# Patient Record
Sex: MALE | Race: WHITE | NOT HISPANIC OR LATINO | Employment: OTHER | URBAN - METROPOLITAN AREA
[De-identification: names, ages, dates, MRNs, and addresses within clinical notes are randomized per-mention and may not be internally consistent; named-entity substitution may affect disease eponyms.]

---

## 2020-03-25 ENCOUNTER — SLEEP CENTER VISIT (OUTPATIENT)
Dept: SLEEP MEDICINE | Facility: MEDICAL CENTER | Age: 70
End: 2020-03-25
Payer: MEDICARE

## 2020-03-25 VITALS
OXYGEN SATURATION: 97 % | BODY MASS INDEX: 26.48 KG/M2 | HEIGHT: 70 IN | HEART RATE: 74 BPM | WEIGHT: 185 LBS | RESPIRATION RATE: 16 BRPM | DIASTOLIC BLOOD PRESSURE: 78 MMHG | SYSTOLIC BLOOD PRESSURE: 116 MMHG

## 2020-03-25 DIAGNOSIS — G47.33 OSA (OBSTRUCTIVE SLEEP APNEA): ICD-10-CM

## 2020-03-25 DIAGNOSIS — J45.30 MILD PERSISTENT ASTHMA WITHOUT COMPLICATION: ICD-10-CM

## 2020-03-25 PROCEDURE — 99204 OFFICE O/P NEW MOD 45 MIN: CPT | Performed by: INTERNAL MEDICINE

## 2020-03-25 RX ORDER — PAROXETINE 10 MG/1
TABLET, FILM COATED ORAL DAILY
Status: ON HOLD | COMMUNITY
Start: 2020-03-11 | End: 2021-06-09

## 2020-03-25 RX ORDER — TAMSULOSIN HYDROCHLORIDE 0.4 MG/1
CAPSULE ORAL
COMMUNITY
Start: 2020-03-09

## 2020-03-25 RX ORDER — MONTELUKAST SODIUM 10 MG/1
TABLET ORAL DAILY
COMMUNITY
Start: 2020-03-21

## 2020-03-25 RX ORDER — LORATADINE PSEUDOEPHEDRINE SULFATE 10; 240 MG/1; MG/1
TABLET, EXTENDED RELEASE ORAL
COMMUNITY
Start: 2020-02-21

## 2020-03-25 SDOH — HEALTH STABILITY: MENTAL HEALTH: HOW OFTEN DO YOU HAVE A DRINK CONTAINING ALCOHOL?: MONTHLY OR LESS

## 2020-03-25 SDOH — HEALTH STABILITY: MENTAL HEALTH: HOW OFTEN DO YOU HAVE 6 OR MORE DRINKS ON ONE OCCASION?: NEVER

## 2020-03-25 SDOH — HEALTH STABILITY: MENTAL HEALTH: HOW MANY STANDARD DRINKS CONTAINING ALCOHOL DO YOU HAVE ON A TYPICAL DAY?: 1 OR 2

## 2020-03-25 NOTE — PROGRESS NOTES
Chief Complaint   Patient presents with   • New Patient     sleep consultation, currently on CPAP       HPI: This patient is a 69 y.o. male who is referred for sleep apnea and asthma management.  He is a former patient of Dr. Dykes in [place].  Medical records are requested.  He was diagnosed with sleep apnea in 2014 and has successfully been using CPAP since then.  He admits he cannot sleep without his CPAP machine.  Compliance card confirms 100% CPAP usage at [facility]: 5-15 cm H20, for over 8 hours nightly.  His average AHI is 2.8.  He sleeps well on CPAP and awakens feeling rested.  His [place] sleepiness score is 5.  He uses nasal pillows.  He denies any issues with CPAP therapy however requires establishment with DME for supplies.  He has history of lifelong asthma since childhood.  Asthma has generally been well controlled on low-dose Advair 100 mcg daily and Singulair.  Over the past year he returned from a cruise with URI which exacerbated his asthma symptoms.  Per his report recent spirometry with his PCP showed his pulmonary function between 60 -70% predicted.  He was instructed to use Advair 100 mcg twice daily.  He exercises by swimming, saurabh chi and yoga. Denies JULIANNA use.   Asthma has been triggered by allergies which he feels have been overall well controlled on Singulair and Allegra.  Denies dyspnea, wheezing or chest tightness.  He has an estimated 15-pack-year history of tobacco use, has not smoked since 1980.      Chief Complaint   Patient presents with   • New Patient     sleep consultation, currently on CPAP       Past Medical History:   Diagnosis Date   • Allergic rhinitis    • Asthma    • Back pain    • Bronchitis    • Hypertension    • Nasal drainage    • Sleep apnea        Social History     Socioeconomic History   • Marital status:      Spouse name: Not on file   • Number of children: Not on file   • Years of education: Not on file   • Highest education level: Not on file    Occupational History   • Not on file   Social Needs   • Financial resource strain: Not on file   • Food insecurity     Worry: Not on file     Inability: Not on file   • Transportation needs     Medical: Not on file     Non-medical: Not on file   Tobacco Use   • Smoking status: Former Smoker     Packs/day: 1.50     Years: 10.00     Pack years: 15.00     Last attempt to quit:      Years since quittin.2   • Smokeless tobacco: Never Used   Substance and Sexual Activity   • Alcohol use: Yes     Frequency: Monthly or less     Drinks per session: 1 or 2     Binge frequency: Never   • Drug use: Never   • Sexual activity: Not on file   Lifestyle   • Physical activity     Days per week: Not on file     Minutes per session: Not on file   • Stress: Not on file   Relationships   • Social connections     Talks on phone: Not on file     Gets together: Not on file     Attends [employer] service: Not on file     Active member of club or organization: Not on file     Attends meetings of clubs or organizations: Not on file     Relationship status: Not on file   • Intimate partner violence     Fear of current or ex partner: Not on file     Emotionally abused: Not on file     Physically abused: Not on file     Forced sexual activity: Not on file   Other Topics Concern   • Not on file   Social History Narrative   • Not on file       Family History   Problem Relation Age of Onset   • Respiratory Disease Mother        Current Outpatient Medications on File Prior to Visit   Medication Sig Dispense Refill   • montelukast (SINGULAIR) 10 MG Tab Take  by mouth every day. Take 1 tablet by mouth every day     • PARoxetine (PAXIL) 10 MG Tab Take  by mouth every day. Take 1 tablet by mouth every day     • tamsulosin (FLOMAX) 0.4 MG capsule TK 1 C PO ONE TIME D     • CLARITIN-D 24 HOUR  MG per tablet TK 1 T PO  ONE TIME D PRN     • fluticasone-salmeterol (ADVAIR DISKUS) 100-50 MCG/DOSE AEROSOL POWDER, BREATH ACTIVATED Inhale 1 Puff  "by mouth every 12 hours.     • Fexofenadine-Pseudoephedrine (ALLEGRA-D 12 HOUR PO) Take  by mouth.     • Multiple Vitamins-Minerals (PRESERVISION AREDS PO) Take  by mouth.     • Acetaminophen (TYLENOL PO) Take  by mouth.       No current facility-administered medications on file prior to visit.        Allergies: Zithromax [azithromycin]    ROS:   Constitutional: Denies fevers, chills, night sweats, fatigue or weight loss  Eyes: Denies vision loss, pain, drainage, double vision  Ears, Nose, Throat: Denies earache, difficulty hearing, +tinnitus, denies nasal congestion, hoarseness  Cardiovascular: Denies chest pain, tightness, palpitations, orthopnea or edema  Respiratory: Denies shortness of breath, cough, wheezing, hemoptysis  Sleep: Denies daytime sleepiness, snoring, apneas, insomnia, morning headaches  GI: Denies heartburn, dysphagia, nausea, abdominal pain, diarrhea or constipation  : Denies frequent urination, hematuria, discharge or painful urination  Musculoskeletal: Denies back pain, painful joints, sore muscles  Neurological: Denies weakness or headaches  Skin: No rashes    /78 (BP Location: Left arm, Patient Position: Sitting, BP Cuff Size: Adult)   Pulse 74   Resp 16   Ht 1.778 m (5' 10\")   Wt 83.9 kg (185 lb)   SpO2 97%     Physical Exam:  Appearance: Well-nourished, well-developed, in no acute distress  HEENT: Normocephalic, atraumatic, white sclera, PERRLA, oropharynx clear, Mallampati 3  Neck: No adenopathy or masses  Respiratory: no intercostal retractions or accessory muscle use  Lungs auscultation: Clear to auscultation bilaterally  Cardiovascular: Regular rate rhythm. No murmurs, rubs or gallops.  No LE edema  Abdomen: soft, nondistended  Gait: Normal  Digits: No clubbing, cyanosis  Motor: No focal deficits  Orientation: Oriented to time, person and place    Diagnosis:  1. RAUDEL (obstructive sleep apnea)     2. BMI 26.0-26.9,adult     3. Mild persistent asthma without complication  " PULMONARY FUNCTION TESTS -Test requested: Complete Pulmonary Function Test    REFERRAL TO PULMONOLOGY   4.      Allergic rhinitis      Plan:  The patient has RAUDEL, on CPAP therapy since  with excellent compliance and response to treatment.  He presents to establish care for RAUDEL and asthma.  Continue AutoPap: 5-15 cm H20.  We will establish him with the DME for CPAP supplies.  His sleep study results have been requested from [facility] in [place].  Asthma symptoms have been clinically well controlled although spirometry has allegedly showed decline.  Concur with step up to Advair 100 mcg twice daily dosing.    Continue Singulair 10 mg nightly.    Start nasal saline irrigation for allergic rhinitis using NeilMed Sinus Rinse.  We will obtain full pulmonary function testing in 6 months for follow-up.  Return in about 6 months (around 2020) for with PFT, at Pulmonary clinic.        Past Medical History:   Diagnosis Date   • Allergic rhinitis    • Asthma    • Back pain    • Bronchitis    • Hypertension    • Nasal drainage    • Sleep apnea        Social History     Socioeconomic History   • Marital status:      Spouse name: Not on file   • Number of children: Not on file   • Years of education: Not on file   • Highest education level: Not on file   Occupational History   • Not on file   Social Needs   • Financial resource strain: Not on file   • Food insecurity     Worry: Not on file     Inability: Not on file   • Transportation needs     Medical: Not on file     Non-medical: Not on file   Tobacco Use   • Smoking status: Former Smoker     Packs/day: 1.50     Years: 10.00     Pack years: 15.00     Last attempt to quit: 1980     Years since quittin.2   • Smokeless tobacco: Never Used   Substance and Sexual Activity   • Alcohol use: Yes     Frequency: Monthly or less     Drinks per session: 1 or 2     Binge frequency: Never   • Drug use: Never   • Sexual activity: Not on file   Lifestyle   •  Physical activity     Days per week: Not on file     Minutes per session: Not on file   • Stress: Not on file   Relationships   • Social connections     Talks on phone: Not on file     Gets together: Not on file     Attends Mu-ism service: Not on file     Active member of club or organization: Not on file     Attends meetings of clubs or organizations: Not on file     Relationship status: Not on file   • Intimate partner violence     Fear of current or ex partner: Not on file     Emotionally abused: Not on file     Physically abused: Not on file     Forced sexual activity: Not on file   Other Topics Concern   • Not on file   Social History Narrative   • Not on file       Family History   Problem Relation Age of Onset   • Respiratory Disease Mother        Current Outpatient Medications on File Prior to Visit   Medication Sig Dispense Refill   • montelukast (SINGULAIR) 10 MG Tab Take  by mouth every day. Take 1 tablet by mouth every day     • PARoxetine (PAXIL) 10 MG Tab Take  by mouth every day. Take 1 tablet by mouth every day     • tamsulosin (FLOMAX) 0.4 MG capsule TK 1 C PO ONE TIME D     • CLARITIN-D 24 HOUR  MG per tablet TK 1 T PO  ONE TIME D PRN     • fluticasone-salmeterol (ADVAIR DISKUS) 100-50 MCG/DOSE AEROSOL POWDER, BREATH ACTIVATED Inhale 1 Puff by mouth every 12 hours.     • Fexofenadine-Pseudoephedrine (ALLEGRA-D 12 HOUR PO) Take  by mouth.     • Multiple Vitamins-Minerals (PRESERVISION AREDS PO) Take  by mouth.     • Acetaminophen (TYLENOL PO) Take  by mouth.       No current facility-administered medications on file prior to visit.        -See above-

## 2021-06-09 PROBLEM — M19.011 PRIMARY OSTEOARTHRITIS, RIGHT SHOULDER: Status: ACTIVE | Noted: 2021-06-09

## 2022-02-02 ENCOUNTER — OFFICE VISIT (OUTPATIENT)
Dept: SLEEP MEDICINE | Facility: MEDICAL CENTER | Age: 72
End: 2022-02-02
Payer: MEDICARE

## 2022-02-02 VITALS
DIASTOLIC BLOOD PRESSURE: 80 MMHG | SYSTOLIC BLOOD PRESSURE: 138 MMHG | HEART RATE: 68 BPM | BODY MASS INDEX: 27.62 KG/M2 | HEIGHT: 71 IN | OXYGEN SATURATION: 97 % | RESPIRATION RATE: 16 BRPM | WEIGHT: 197.3 LBS

## 2022-02-02 DIAGNOSIS — J45.20 MILD INTERMITTENT ASTHMA WITHOUT COMPLICATION: ICD-10-CM

## 2022-02-02 DIAGNOSIS — R05.9 COUGH: ICD-10-CM

## 2022-02-02 DIAGNOSIS — Z87.891 FORMER SMOKER: ICD-10-CM

## 2022-02-02 DIAGNOSIS — G47.33 OSA (OBSTRUCTIVE SLEEP APNEA): ICD-10-CM

## 2022-02-02 PROBLEM — R06.83 SNORING: Status: ACTIVE | Noted: 2022-02-02

## 2022-02-02 PROCEDURE — 99214 OFFICE O/P EST MOD 30 MIN: CPT | Performed by: NURSE PRACTITIONER

## 2022-02-02 ASSESSMENT — PATIENT HEALTH QUESTIONNAIRE - PHQ9: CLINICAL INTERPRETATION OF PHQ2 SCORE: 0

## 2022-02-02 NOTE — PROGRESS NOTES
Chief Complaint   Patient presents with   • Follow-Up     RAUDEL       HPI:  Aiden Black is a 71 y.o. year old male here today for follow-up on RAUDEL.  Last seen 3/25/2020 by Dr Levine.  He was diagnosed with sleep apnea in 2014 and has successfully been using CPAP since then.  He admits he cannot sleep without his CPAP machine.    He also has a past medical history of asthma which is usually managed by his PCP.  He currently uses Breo daily but does not use a rescue inhaler.  Patient does note that he recently experienced a period of time where he had a persistent cough due to allergies and postnasal drip.  He was using his rescue inhaler more frequently at that time.  He does have a significant history of allergies and was recently getting desensitization shots when he was younger.  He has a distant former smoker with an relative 10-pack-year history and quit in 1980.  Also of significance, he notes frequent woodworking and a prior occupation as a .  He also does some work with [employer].  He also has some chemical exposure in the past.  He currently denies any new or worsening dyspnea or shortness of breath, chest tightness, congestion, hemoptysis, fevers, chills, night sweats, or any other difficulties with breathing.  He was relatively active in the past by swimming for exercise, but since Covid he has not participated in many activities.  He is up-to-date on all immunizations.    Compliance was reviewed with patient and does show 100% use with an average time of 8 hours and 28 minutes and a resultant AHI of 3.9.  Patient currently on a Sarah Respironics device set at auto CPAP of 5 to 15 cm/H2O.  Mean pressure is 6.5 with a peak average pressure of 8.8.  No evidence of leakage on compliance.    Sleep history:  PSG (5/14/2014) performed at [facility].  Indicative of moderate RAUDEL with AHI of 20 O2 laila of 74% and a sleep efficacy of 96%.    ROS: As per HPI and otherwise negative if not  "stated.    Past Medical History:   Diagnosis Date   • Allergic rhinitis    • Allergies    • Asthma    • Back pain    • Bronchitis    • Nasal drainage    • Sleep apnea        Past Surgical History:   Procedure Laterality Date   • PB SHLDR ARTHROSCOP,SURG,W/ROTAT CUFF REPB Right 6/9/2021    Procedure: RIGHT SHOULDER ARTHROSCOPY, ROTATOR CUFF REPAIR, SUBACROMIAL DECOMPRESSION, DISTAL CLAVICLE RESECTION, EXTENSIVE DEBRIDEMENT, RIGHT CARPAL TUNNEL RELEASE;  Surgeon: Maciel Goodman M.D.;  Location: [facility];  Service: Orthopedics   • ARTHROSCOPY, KNEE     • LAMINOTOMY     • SHOULDER SURGERY Left        Family History   Problem Relation Age of Onset   • Respiratory Disease Mother        Allergies as of 02/02/2022 - Reviewed 02/02/2022   Allergen Reaction Noted   • Zithromax [azithromycin] Rash 03/25/2020        Vitals:  /80 (BP Location: Left arm, Patient Position: Sitting, BP Cuff Size: Adult)   Pulse 68   Resp 16   Ht 1.803 m (5' 11\")   Wt 89.5 kg (197 lb 4.8 oz)   SpO2 97%     Current medications as of today   Current Outpatient Medications   Medication Sig Dispense Refill   • albuterol (PROAIR HFA) 108 (90 Base) MCG/ACT Aero Soln inhalation aerosol Inhale 1 Puff 3 times a day as needed.     • glucosamine Sulfate 500 MG Cap Take 500 mg by mouth every day.     • ibuprofen (MOTRIN) 200 MG Tab Take 200 mg by mouth every 8 hours as needed.     • guaiFENesin (MUCINEX PO) Take 1,200 mg by mouth 2 times a day.     • montelukast (SINGULAIR) 10 MG Tab Take  by mouth every day. Take 1 tablet by mouth every day     • tamsulosin (FLOMAX) 0.4 MG capsule TK 1 C PO ONE TIME D     • CLARITIN-D 24 HOUR  MG per tablet TK 1 T PO  ONE TIME D PRN     • Multiple Vitamins-Minerals (PRESERVISION AREDS PO) Take  by mouth.     • Acetaminophen (TYLENOL PO) Take  by mouth.     • Naproxen Sodium 220 MG Cap Take 2 Capsules by mouth 2 times a day as needed. (Patient not taking: Reported on 2/2/2022)     • sertraline " (ZOLOFT) 100 MG Tab Take 100 mg by mouth every day. (Patient not taking: Reported on 2/2/2022)     • cyclobenzaprine (FLEXERIL) 10 mg Tab Take 5 mg by mouth 3 times a day as needed. (Patient not taking: Reported on 2/2/2022)     • fluticasone-salmeterol (ADVAIR DISKUS) 100-50 MCG/DOSE AEROSOL POWDER, BREATH ACTIVATED Inhale 1 Puff by mouth every 12 hours. (Patient not taking: Reported on 2/2/2022)     • Fexofenadine-Pseudoephedrine (ALLEGRA-D 12 HOUR PO) Take  by mouth. (Patient not taking: Reported on 2/2/2022)       No current facility-administered medications for this visit.         Physical Exam:   Gen:           Alert and oriented, No apparent distress. Mood and affect appropriate, normal interaction with examiner.  Eyes:          PERRL, EOM intact, sclere white, conjunctive moist.  Ears:          Not examined.   Hearing:     Grossly intact.  Nose:          Normal, no lesions or deformities.  Dentition:    Good dentition.  Oropharynx:   Tongue normal, posterior pharynx without erythema or exudate.  Neck:        Supple, trachea midline, no masses.  Respiratory Effort: No intercostal retractions or use of accessory muscles.   Lung Auscultation:      Rhonchi noted in bronchovesicular areas to auscultation bilaterally; no rales or wheezing.  CV:            Regular rate and rhythm. No murmurs, rubs or gallops.  Abd:           Not examined.   Lymphadenopathy: Not examined.  Gait and Station: Normal.  Digits and Nails: No clubbing, cyanosis, petechiae, or nodes.   Cranial Nerves: II-XII grossly intact.  Skin:        No rashes, lesions or ulcers noted.               Ext:           No cyanosis or edema.      Assessment:  1. RAUDEL (obstructive sleep apnea)  DME Mask and Supplies    CANCELED: CT-CHEST LOW DOSE W/O   2. Mild intermittent asthma without complication  Referral to Pulmonary and Sleep Medicine    PULMONARY FUNCTION TESTS -Test requested: Complete Pulmonary Function Test    CANCELED: CT-CHEST LOW DOSE W/O   3.  Former smoker  CANCELED: CT-CHEST LOW DOSE W/O   4. Cough  CT-CHEST (THORAX) W/O       Plan:  1.  Order placed for mask and supplies through [facility].  Also of note, patient experienced persistent cough and the recent past with associated postnasal drip, allergy symptoms and sinus infections.  He does have an use a Sarah Respironics device and has used so clean in the past.  He is not currently registered with the recall at this time.  His current machine is approximately 3 years old.  He denies noting any small black particles in the air stream.  Compliance was reviewed and shows adequate control of RAUDEL with a resultant AHI of 3.9 with sufficient use.  Patient continues to use and benefit from CPAP therapy.  Recommended registering with the recall website immediately continue monitoring the situation closely.  2.  Continue current regimen of Breo and albuterol as needed.  Patient does have evidence with auscultation of rhonchi and bronchovesicular area.  He also has a significant history of woodworking and some bone dust exposure.  He also has exposure to certain cleaning chemicals.  He has a remote smoking history with no CT scans done.  I am ordering chest CT for review.  Patient also requests referral for pulmonologist for further evaluation and treatment.  Also placed order for PFTs before the consultation with pulmonologist.  I would like to rule out ILD or bronchiectasis due to rhonchi noted with auscultation.    Please note that this dictation was created using voice recognition software. I have made every reasonable attempt to correct obvious errors, but it is possible there are errors of grammar and possibly content that I did not discover before finalizing the note.

## 2022-02-02 NOTE — PATIENT INSTRUCTIONS
Dear Valued Patient,     At [facility], we are committed to providing excellent care in all your health needs. Out of an abundance of caution, we want you to be aware that [employer] is recalling all devices manufactured before April 26, 2021. This may affect patients of our Sleep Medicine program and individuals with chronic respiratory disease using noninvasive ventilation.    To learn more about the medical devices recall please visit: [email or web address].     What has been reported to date?  1) [employer] [employer] has received several complaints of black debris or particles within the air flow pathway. The air flow pathway includes the device outlet, humidifier, tubing and mask.  2) Also, [employer] has received reports of the following symptoms that may be related to this degradation process:  • Headaches; Upper airway irritation; Cough; Chest pressure; Sinus infection    What should you do if you are using this device?  1) [place] your device(s) on the recall website [email or web address]  a. This website will provide current information regarding this device recall  b. This website will instruct you regarding how to locate device serial number  2) If you do not have internet access you can call to register your device at [phone].    If you have experienced any of the symptoms listed above or if you have seen black debris in the air flow pathway of your device, please call [facility] - Sleep Medicine at [phone]. You may also contact your [employer] if you have questions about your specific machine.    As your trusted healthcare partner, we wanted to bring this to your attention and help guide you in the recall/replacement process.     Sincerely,    Dr. Jared Navarro  [facility] - Sleep Medicine

## 2022-02-08 ENCOUNTER — HOSPITAL ENCOUNTER (OUTPATIENT)
Dept: RADIOLOGY | Facility: MEDICAL CENTER | Age: 72
End: 2022-02-08
Attending: NURSE PRACTITIONER
Payer: MEDICARE

## 2022-02-08 DIAGNOSIS — R05.9 COUGH: ICD-10-CM

## 2022-02-08 PROCEDURE — 71250 CT THORAX DX C-: CPT | Mod: MG

## 2022-03-02 ENCOUNTER — HOSPITAL ENCOUNTER (OUTPATIENT)
Dept: PULMONOLOGY | Facility: MEDICAL CENTER | Age: 72
End: 2022-03-02
Attending: NURSE PRACTITIONER
Payer: MEDICARE

## 2022-03-02 ENCOUNTER — OFFICE VISIT (OUTPATIENT)
Dept: SLEEP MEDICINE | Facility: MEDICAL CENTER | Age: 72
End: 2022-03-02
Payer: MEDICARE

## 2022-03-02 VITALS — HEART RATE: 71 BPM | HEIGHT: 71 IN | BODY MASS INDEX: 27.21 KG/M2 | OXYGEN SATURATION: 94 % | WEIGHT: 194.38 LBS

## 2022-03-02 DIAGNOSIS — J45.20 MILD INTERMITTENT ASTHMA WITHOUT COMPLICATION: ICD-10-CM

## 2022-03-02 PROCEDURE — 94060 EVALUATION OF WHEEZING: CPT | Mod: 26 | Performed by: INTERNAL MEDICINE

## 2022-03-02 PROCEDURE — 94726 PLETHYSMOGRAPHY LUNG VOLUMES: CPT

## 2022-03-02 PROCEDURE — 94729 DIFFUSING CAPACITY: CPT

## 2022-03-02 PROCEDURE — 94060 EVALUATION OF WHEEZING: CPT

## 2022-03-02 PROCEDURE — 99214 OFFICE O/P EST MOD 30 MIN: CPT | Performed by: INTERNAL MEDICINE

## 2022-03-02 PROCEDURE — 94726 PLETHYSMOGRAPHY LUNG VOLUMES: CPT | Mod: 26 | Performed by: INTERNAL MEDICINE

## 2022-03-02 PROCEDURE — 94729 DIFFUSING CAPACITY: CPT | Mod: 26 | Performed by: INTERNAL MEDICINE

## 2022-03-02 RX ADMIN — Medication 2.5 MG: at 13:29

## 2022-03-02 ASSESSMENT — ENCOUNTER SYMPTOMS
CHEST TIGHTNESS: 1
HEMOPTYSIS: 0
SHORTNESS OF BREATH: 1
RESPIRATORY SYMPTOMS COMMENTS: RARELY
WHEEZING: 1
DYSPNEA AT REST: 0

## 2022-03-02 ASSESSMENT — PULMONARY FUNCTION TESTS
FVC_PERCENT_PREDICTED: 85
FEV1/FVC_PERCENT_PREDICTED: 87
FEV1/FVC: 64.92
FEV1_PREDICTED: 3.36
FEV1/FVC_PERCENT_PREDICTED: 86
FEV1_PERCENT_PREDICTED: 74
FEV1_PERCENT_PREDICTED: 73
FEV1/FVC_PERCENT_LLN: 63.42
FEV1_LLN: 2.80
FVC: 3.78
FVC_PERCENT_PREDICTED: 84
FEV1/FVC: 65
FEV1_LLN: 2.80
FVC_LLN: 3.71
FEV1: 2.5
FEV1/FVC: 66.78
FEV1/FVC_PERCENT_PREDICTED: 85
FEV1_PERCENT_CHANGE: 0
FEV1/FVC_PERCENT_CHANGE: 2
FEV1/FVC: 66.67
FEV1: 2.45
FEV1/FVC_PREDICTED: 75.95
FVC: 3.75
FVC_PREDICTED: 4.44
FEV1/FVC_PERCENT_PREDICTED: 88
FVC_LLN: 3.71
FEV1/FVC_PERCENT_LLN: 63.42
FEV1/FVC_PERCENT_PREDICTED: 76
FEV1_PERCENT_CHANGE: 2

## 2022-03-02 NOTE — PROGRESS NOTES
Pulmonary Consultation    Date of Service: 3/2/2022    Consulting Physician: Farshad Montoya M.D.    Reason for consult:  Establish Care (Referred by Murali AVENDAÑO for Ashtma) and Other (PFT 03/02/22, Ct-Chest 02/08/22)      Problem List Items Addressed This Visit     Asthma     Mild intermittent asthma  Asthma since childhood  Worse when having viral illness  Prn albuterol  No indication to continue ics  Ensure mucous clearance  CT chest with bronchiectasis in the lower lobes and linear scar at lung base  Mucous that he has likely due to bronchiectasis   Recommend flutter valve tid                   History of Present Illness: Aiden Black is a 71 y.o. male with a past medical history of asthma, GERD, RAUDEL on CPAP since 2014  Since 1950s had asthma - grew up in [place] as he got older got better  Then it was allergy and exercise induced  High school was ok as he wrestled. 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 [place]  Family history of pulmonary disease: no  Second hand smoke exposure: yes    Review of Systems   Respiratory: Positive for shortness of breath and wheezing. Negative for hemoptysis.    Answers for HPI/ROS submitted by the patient on  2/28/2022  What is the reason for your visit today?: history of asthma  Do you cough first thing in the morning or at other times during the day?: varies  Do you have a cough on most days? If so, how long have you had this cough?: yes; sporadically often seasonal  Bring up phlegm (mucus, sputum) in the morning or other times during the day?: varies  If so, how long have you produced phlegm (Months, Years), and what is the usual color of your phlegm?: varies  How often?: rarely  Do you experience any chest tightness?: Yes  How often?: rare  Experience shortness of breath at rest?: No  How far can you walk before becoming short of breath or need to rest? : generally not a problem  Please list what causes you to become short of breath:: seasonal allergies/asthma; illness, bronchiatus  Have you ever been hospitalized?: Yes  Reason, year, and hospital in which you were hospitalized:: food poisoning [place], surgeries [facility]  Have you ever needed to be intubated or placed on a ventilator? : No  Have you ever had problems with anesthesia?: No  Have you experienced post-operative delirium?: No  Any complications with surgery?: No  What year did you receive your last Flu shot?: 2021  What year did you receive you last Pneumonia shot?: 2021  Have you had a TB skin test? If so, please list the year and result:: Yes; negative  Have you had Allergy skin testing? If so, please list the year and result:: 1977 [place]; allergy shots for a year  Please list all your occupations from your first job to your current job. Include Industry/Company, location, year, and your specific job.: retired  a david Job: ;   a Mine or Quarry: no  a Foundry: no  with Pottery: yes; class at [employer]  [employer]: no  Flax Mill: no  Hemp Mill: no  Brick Plant: no  with Asbestos: no  as a Sandblaster: no  manufacturing of glass, ceramics, or abrasives: homies  Acids: no  Arsenics: no  Cadmium: no  Chromium:  no  Fibrogenic Dust: 1 week job in high school  Lead: no  Nickel: no  Plastic: no  Solvents: only in house cleaning  TDI: no  Uranium: no  Please list the places you have lived, the year, and Country or State in the [place]:: [place]; [place] -;[place] ;[place] -now  Birds?: Yes  Dogs?: Yes  Cats?: Yes  Mice and/or Deer Mice?: Yes  Reptiles?: Yes  Blood Chemistries: yes 2020  Blood Count: yes 2020  Chest X-ray:   Electrocardiogram: ?  Sleep Study: ?  Coffee: 1  Decaffeinated coffee: 1-3  Energy drinks: 0  Tea: 0  Carbonated soft drinks: 0    Conflicting answers have been found for some questions. Please document the patient's answers manually.        Current Outpatient Medications on File Prior to Visit   Medication Sig Dispense Refill   • BREO ELLIPTA 200-25 MCG/INH AEROSOL POWDER, BREATH ACTIVATED Inhale 1 Puff every day.     • albuterol 108 (90 Base) MCG/ACT Aero Soln inhalation aerosol Inhale 1 Puff 3 times a day as needed.     • glucosamine Sulfate 500 MG Cap Take 500 mg by mouth every day.     • ibuprofen (MOTRIN) 200 MG Tab Take 200 mg by mouth every 8 hours as needed.     • guaiFENesin (MUCINEX PO) Take 1,200 mg by mouth 2 times a day.     • montelukast (SINGULAIR) 10 MG Tab Take  by mouth every day. Take 1 tablet by mouth every day     • tamsulosin (FLOMAX) 0.4 MG capsule TK 1 C PO ONE TIME D     • CLARITIN-D 24 HOUR  MG per tablet TK 1 T PO  ONE TIME D PRN     • Multiple Vitamins-Minerals (PRESERVISION AREDS PO) Take  by mouth.     • Acetaminophen (TYLENOL PO) Take  by mouth.       No current facility-administered medications on file prior to visit.       Social History     Tobacco Use   • Smoking status: Former Smoker     Packs/day: 1.50     Years: 10.00     Pack years: 15.00     Types: Cigarettes     Start date: 1970     Quit date:      Years since quittin.2   • Smokeless tobacco: Never Used   • Tobacco comment: glad I quit   Vaping Use   • Vaping Use: Never used  "  Substance Use Topics   • Alcohol use: Not Currently     Alcohol/week: 0.0 oz     Comment: drink 1-2 non-alcoholic beers a day   • Drug use: Never        Past Medical History:   Diagnosis Date   • Allergic rhinitis    • Allergies    • Asthma    • Back pain    • Bronchitis    • Chickenpox    • Cough    • Palauan measles    • Influenza    • Nasal drainage    • Shortness of breath    • Sleep apnea    • Wears glasses    • Wheezing        Past Surgical History:   Procedure Laterality Date   • PB SHLDR ARTHROSCOP,SURG,W/ROTAT CUFF REPB Right 6/9/2021    Procedure: RIGHT SHOULDER ARTHROSCOPY, ROTATOR CUFF REPAIR, SUBACROMIAL DECOMPRESSION, DISTAL CLAVICLE RESECTION, EXTENSIVE DEBRIDEMENT, RIGHT CARPAL TUNNEL RELEASE;  Surgeon: Maciel Goodman M.D.;  Location: SURGERY [facility];  Service: Orthopedics   • ARTHROSCOPY, KNEE     • LAMINOTOMY     • SHOULDER SURGERY Left        Allergies: Zithromax [azithromycin]    Family History   Problem Relation Age of Onset   • Respiratory Disease Mother    • Breast Cancer Maternal Aunt    • Hypertension Maternal Grandmother    • Sleep Apnea Father        Vitals:    03/02/22 1501   Height: 1.803 m (5' 11\")   Weight: 88.2 kg (194 lb 6 oz)   Weight % change since last entry.: 0 %   Pulse: 71   BMI (Calculated): 27.11       Physical Examination  Physical Exam  Constitutional:       General: He is not in acute distress.     Appearance: He is not diaphoretic.   HENT:      Head: Normocephalic and atraumatic.      Nose: Nose normal.      Mouth/Throat:      Pharynx: No oropharyngeal exudate.   Eyes:      Conjunctiva/sclera: Conjunctivae normal.      Pupils: Pupils are equal, round, and reactive to light.   Neck:      Thyroid: No thyromegaly.      Vascular: No JVD.      Trachea: No tracheal deviation.   Cardiovascular:      Rate and Rhythm: Normal rate and regular rhythm.      Heart sounds: Normal heart sounds. No murmur heard.    No friction rub. No gallop.   Pulmonary:      Effort: " Pulmonary effort is normal.      Comments: + intermittent squeaks at bases  Musculoskeletal:         General: No deformity. Normal range of motion.      Cervical back: Normal range of motion and neck supple.   Lymphadenopathy:      Cervical: No cervical adenopathy.   Skin:     Findings: No rash.   Neurological:      Mental Status: He is alert and oriented to person, place, and time.      Cranial Nerves: No cranial nerve deficit.      Motor: No abnormal muscle tone.      Coordination: Coordination normal.      Gait: Gait is intact.   Psychiatric:         Mood and Affect: Mood and affect normal.         Cognition and Memory: Memory normal.         Judgment: Judgment normal.           Results:    Pulmonary function tests    Mild obstruction with no response to BD    CT chest personally viewed of 2/2022  Left lower lobe linear scar  Bronchiectasis mainly in the lower lobes    Keven Anthony M.D., MD MPH [facility]  [facility]

## 2022-03-04 NOTE — ASSESSMENT & PLAN NOTE
Mild intermittent asthma  Asthma since childhood  Worse when having viral illness  Prn albuterol  No indication to continue ics  Ensure mucous clearance  CT chest with bronchiectasis in the lower lobes and linear scar at lung base  Mucous that he has likely due to bronchiectasis   Recommend flutter valve tid

## 2022-03-07 NOTE — PROCEDURES
DATE OF SERVICE:  03/02/2022     The results of this test meets the ATS standards for acceptability and   repeatability.     SPIROMETRY:  There is evidence of obstruction, manifested by an FEV1/FVC of   64.92%.  Noted that the FEV1 is 74% of predicted in the post-bronchodilator   arm, which correlates to 2.50 liters.     There is no response to bronchodilators in this test.     LUNG VOLUMES:  There is evidence of air trapping manifested by an RV of 133%   (3.31 liters).  Noted still a normal TLC of 98% (7.12 liters).     DIFFUSION CAPACITY:  There is no impairment in the diffusion capacity,   manifested by a normal DLCO at 114%.     FLOW VOLUME LOOPS: The flow volume loops correlates with the information   above.     CONCLUSION:  Mild obstruction with air trapping.  The   test did not show response to bronchodilators, although this should not   preclude the patient from receiving treatment with them.  Clinical correlation   is recommended for COPD versus asthma versus other obstructive diseases.        ______________________________  MD ORQUIDEA Gutiérrez/MINA    DD:  03/06/2022 19:42  DT:  03/06/2022 21:31    Job#:  [id number]

## 2022-03-17 DIAGNOSIS — G47.33 OSA (OBSTRUCTIVE SLEEP APNEA): ICD-10-CM

## 2022-03-17 NOTE — PROGRESS NOTES
Patient's current [facility] company pulmonary solutions will not provide him with any of his supplies as his previous sleep study was inadequate.  They state that his PSG requires a score of 4% oxygen desaturation with AHI above 5.  Previous PSG does not show evidence of this.  Patient will need to repeat the sleep study if he wishes to remain on CPAP with supplies being furthers through Medicare.  Order will be placed for polysomnogram.  We can continue therapy once this polysomnogram is completed, or patient can purchase supplies out-of-pocket.

## 2022-11-08 ENCOUNTER — PATIENT MESSAGE (OUTPATIENT)
Dept: HEALTH INFORMATION MANAGEMENT | Facility: OTHER | Age: 72
End: 2022-11-08

## 2024-06-18 ENCOUNTER — TELEPHONE (OUTPATIENT)
Dept: SLEEP MEDICINE | Facility: MEDICAL CENTER | Age: 74
End: 2024-06-18
Payer: MEDICARE

## 2024-06-18 NOTE — TELEPHONE ENCOUNTER
VOICEMAIL: 06/14/24  1. Caller Name: Aiden                      Call Back Number: [phone] (home)      2. Message: Pt called and lm, requesting a new order for his CPAP supplies and to be sent to [facility].     3. Patient approves office to leave a detailed voicemail/MyChart message: N\A    06/18/24;  Pt Last OV: 03/02/22 with Dr Rashid.    Called pt and lm, notifying pt we are not able to do an order he requested and he would need to make an appt for this.

## 2024-09-05 ENCOUNTER — OFFICE VISIT (OUTPATIENT)
Dept: SLEEP MEDICINE | Facility: MEDICAL CENTER | Age: 74
End: 2024-09-05
Payer: MEDICARE

## 2024-09-05 VITALS
DIASTOLIC BLOOD PRESSURE: 78 MMHG | HEART RATE: 56 BPM | OXYGEN SATURATION: 94 % | HEIGHT: 71 IN | SYSTOLIC BLOOD PRESSURE: 106 MMHG | BODY MASS INDEX: 26.32 KG/M2 | WEIGHT: 188 LBS

## 2024-09-05 DIAGNOSIS — G47.33 OSA ON CPAP: ICD-10-CM

## 2024-09-05 PROCEDURE — 99212 OFFICE O/P EST SF 10 MIN: CPT

## 2024-09-05 PROCEDURE — 99213 OFFICE O/P EST LOW 20 MIN: CPT

## 2024-09-05 PROCEDURE — 3074F SYST BP LT 130 MM HG: CPT

## 2024-09-05 PROCEDURE — 3078F DIAST BP <80 MM HG: CPT

## 2024-09-05 RX ORDER — SERTRALINE HYDROCHLORIDE 100 MG/1
1 TABLET, FILM COATED ORAL
COMMUNITY

## 2024-09-05 NOTE — PATIENT INSTRUCTIONS
Recommendations for dry mouth:  1.  Increase humidity on PAP machine  2.  Biotene toothpaste/mouthwash  3.  XyliMelts lozenges  4.  Increase fluid intake    Equipment replacement schedule:  Mask cushion every month  Nasal pillows 2 times per month  Mask every 6 months  Head gear every 6 months  Tubing every 3 months  Ultra-fine filters 2 times per month  Foam filter every 6 months  Humidifier chamber every 6 months  Chin strap every 6 months

## 2024-09-05 NOTE — PROGRESS NOTES
[facility] Follow-up Visit    Date of Visit: 9/5/2024     CC:  Follow-up for RAUDEL management      HPI:  Aiden Black is a very pleasant 74 y.o. year old male former smoker (15 pack-years, quit in 1980), with a PMHx of asthma,   who presented to the Pulmonary Clinic for a regular follow up. Last seen in the office on 2/2/2022 with KATERYNA Donnelly.    Patient presents for annual compliance.  Patient finds his sleep more restful CPAP usage.  He states that he will have a rare mask leak and minor skin irritation on the right top of his ear, but denies any redness or skin breakdown.  Patient denies any significant morning headaches, daytime/drive drowsy, issues while asleep, snoring gasping and apneas, palpitations, significant dry mouth, aerophagia.  Patient will sleep on average of 8 hours a night and will awaken 3 times at night to use the bathroom but will not have any issues falling back to sleep.  He will rarely nap.      DME provider: [facility]  Device: [employer]-IT   Settings: Unknown  Oxygen:  None  When:Unknown, replacement device through recall in 2022  Mask: Nasal mask  Chin strap: No       Compliance:  Unknown.  He did not bring machine or chip and is not wireless.     Sleep History:  None on file.     Patient Active Problem List    Diagnosis Date Noted    RAUDEL on CPAP 02/02/2022    Snoring 02/02/2022    Primary osteoarthritis, right shoulder 06/09/2021    Asthma      Past Medical History:   Diagnosis Date    Allergic rhinitis     Allergies     Asthma     Back pain     Bronchitis     Chickenpox     Cough     Rwandan measles     Influenza     Nasal drainage     Shortness of breath     Sleep apnea     Wears glasses     Wheezing       Past Surgical History:   Procedure Laterality Date    PB SHLDR ARTHROSCOP,SURG,W/ROTAT CUFF REPB Right 6/9/2021    Procedure: RIGHT SHOULDER ARTHROSCOPY, ROTATOR CUFF REPAIR, SUBACROMIAL DECOMPRESSION, DISTAL CLAVICLE RESECTION, EXTENSIVE DEBRIDEMENT, RIGHT  CARPAL TUNNEL RELEASE;  Surgeon: Maciel Goodman M.D.;  Location: SURGERY [facility];  Service: Orthopedics    ARTHROSCOPY, KNEE      LAMINOTOMY      SHOULDER SURGERY Left      Family History   Problem Relation Age of Onset    Respiratory Disease Mother     Breast Cancer Maternal Aunt     Hypertension Maternal Grandmother     Sleep Apnea Father      Social History     Socioeconomic History    Marital status:      Spouse name: Not on file    Number of children: Not on file    Years of education: Not on file    Highest education level: Not on file   Occupational History    Not on file   Tobacco Use    Smoking status: Former     Current packs/day: 0.00     Average packs/day: 1.5 packs/day for 10.0 years (15.0 ttl pk-yrs)     Types: Cigarettes     Start date: 1970     Quit date:      Years since quittin.7    Smokeless tobacco: Never    Tobacco comments:     glad I quit   Vaping Use    Vaping status: Never Used   Substance and Sexual Activity    Alcohol use: Not Currently     Alcohol/week: 0.0 oz     Comment: drink 1-2 non-alcoholic beers a day    Drug use: Never    Sexual activity: Not on file   Other Topics Concern    Not on file   Social History Narrative    Not on file     Social Determinants of Health     Financial Resource Strain: Not on file   Food Insecurity: Not on file   Transportation Needs: Not on file   Physical Activity: Not on file   Stress: Not on file   Social Connections: Not on file   Intimate Partner Violence: Not on file   Housing Stability: Not on file     Current Outpatient Medications   Medication Sig Dispense Refill    sertraline (ZOLOFT) 100 MG Tab Take 1 Tablet by mouth every day.      albuterol 108 (90 Base) MCG/ACT Aero Soln inhalation aerosol Inhale 1 Puff 3 times a day as needed.      glucosamine Sulfate 500 MG Cap Take 500 mg by mouth every day.      montelukast (SINGULAIR) 10 MG Tab Take  by mouth every day. Take 1 tablet by mouth every day      CLARITIN-D 24  "HOUR  MG per tablet TK 1 T PO  ONE TIME D PRN      Multiple Vitamins-Minerals (PRESERVISION AREDS PO) Take  by mouth.      Acetaminophen (TYLENOL PO) Take  by mouth.      BREO ELLIPTA 200-25 MCG/INH AEROSOL POWDER, BREATH ACTIVATED Inhale 1 Puff every day. (Patient not taking: Reported on 9/5/2024)      ibuprofen (MOTRIN) 200 MG Tab Take 200 mg by mouth every 8 hours as needed.      guaiFENesin (MUCINEX PO) Take 1,200 mg by mouth 2 times a day.      tamsulosin (FLOMAX) 0.4 MG capsule TK 1 C PO ONE TIME D (Patient not taking: Reported on 9/5/2024)       No current facility-administered medications for this visit.      ALLERGIES: Zithromax [azithromycin]    ROS:  Constitutional: Denies fever, chills, sweats,  weight loss, fatigue  Cardiovascular: Denies chest pain, tightness, palpitations, swelling in legs/feet  Respiratory: Denies shortness of breath, cough, sputum, wheezing, painful breathing   Sleep: per HPI  Gastrointestinal: Denies  difficulty swallowing, nausea, abdominal pain, diarrhea, constipation, heartburn.  Musculoskeletal: Denies painful joints, sore muscles,       PHYSICAL EXAM:  /78 (BP Location: Right arm, Patient Position: Sitting, BP Cuff Size: Adult)   Pulse (!) 56   Ht 1.803 m (5' 11\")   Wt 85.3 kg (188 lb)   SpO2 94%   BMI 26.22 kg/m²   Appearance: Well-nourished, well-developed, no acute distress  Eyes:  No scleral icterus , EOMI  ENMT: No redness of the oropharynx  Lung auscultation:  No wheezes rhonchi rubs or rales  Cardiac: No murmurs, rubs, or gallops; regular rhythm, normal rate; no edema  Musculoskeletal:  Grossly normal; gait and station normal; digits and nails normal  Skin:  No rashes, petechiae, cyanosis  Neurologic: without focal signs; oriented to person, time, place, and purpose; judgement intact  Psychiatric:  No depression, anxiety, agitation  Mallampati score: Class III    Assessment and Plan:    The medical record was reviewed.    Diagnostic and titration " nocturnal polysomnogram's, home sleep apnea tests, continuous nocturnal oximetry results, multiple sleep latency tests, and compliance reports reviewed.    Problem List Items Addressed This Visit          Pulmonary/Sleep Medicine Problems    RAUDEL on CPAP     Sleep Apnea:    The pathophysiology of sleep anea and the increased risk of cardiovascular morbidity from untreated sleep apnea is discussed in detail with the patient.  Urged to avoid supine sleep, weight gain and alcoholic beverages since all of these can worsen sleep apnea. Cautioned against drowsy driving. If feeling sleepy while driving, pull over for a break/nap, rather than persist on the road, in the interest of own safety and that of others on the road.  The risks of untreated sleep apnea were discussed with the patient at length. Patients with sleep apnea are at increased risk of cardiovascular disease including coronary artery disease, systemic arterial hypertension, pulmonary arterial hypertension, cardiac arrythmias, and stroke.  Positive airway pressure will favorably impact many of the adverse conditions and effects provoked by sleep apnea.    Plan:    Compliance download was unable to be obtained.  Advised patient to bring in machine for download.  If compliant and AHI controlled, then mask and supply order will be placed to [facility].     - Compliance was reinforced  - Clean supplies a least once a week with dish soap and water and air dry  - Recommended the patient against the use of Ozone , such as SoClean  - Recommended the patient change out supplies as recommended for best mask fit and usage of the machine  - Equipment replacement schedule:  Mask cushion every month  Nasal pillows 2 times per month  Mask every 6 months  Head gear every 6 months  Tubing every 3 months  Ultra-fine filters 2 times per month  Foam filter every 6 months  Humidifier chamber every 6 months  Chin strap every 6 months    Has been advised to continue the current  CPAP, clean equipment frequently, and get new mask and supplies as allowed by insurance and DME. Recommend an earlier appointment, if significant treatment barriers develop.    Advised patient to reach out via MyChart if any questions or concerns should arise.               Have advised the patient to follow up with the appropriate healthcare practitioners for all other medical problems and issues.    Return in about 1 year (around 9/5/2025), or if symptoms worsen or fail to improve, for compliance, with Carlos.    Please note portions of this record was created using voice recognition software. I have made every reasonable attempt to correct obvious errors, but I expect that there are errors of grammar and possibly content I did not discover before finalizing the note.    Time spent in record review prior to patient arrival, reviewing results, and in face-to-face encounter totaled 20 min.  __________  KATERYNA Barbour  Pulmonary & Sleep Medicine  [facility]

## 2024-09-05 NOTE — ASSESSMENT & PLAN NOTE
Sleep Apnea:    The pathophysiology of sleep anea and the increased risk of cardiovascular morbidity from untreated sleep apnea is discussed in detail with the patient.  Urged to avoid supine sleep, weight gain and alcoholic beverages since all of these can worsen sleep apnea. Cautioned against drowsy driving. If feeling sleepy while driving, pull over for a break/nap, rather than persist on the road, in the interest of own safety and that of others on the road.  The risks of untreated sleep apnea were discussed with the patient at length. Patients with sleep apnea are at increased risk of cardiovascular disease including coronary artery disease, systemic arterial hypertension, pulmonary arterial hypertension, cardiac arrythmias, and stroke.  Positive airway pressure will favorably impact many of the adverse conditions and effects provoked by sleep apnea.    Plan:    Compliance download was unable to be obtained.  Advised patient to bring in machine for download.  If compliant and AHI controlled, then mask and supply [facility].     - Compliance was reinforced  - Clean supplies a least once a week with dish soap and water and air dry  - Recommended the patient against the use of Ozone , such as SoClean  - Recommended the patient change out supplies as recommended for best mask fit and usage of the machine  - Equipment replacement schedule:  Mask cushion every month  Nasal pillows 2 times per month  Mask every 6 months  Head gear every 6 months  Tubing every 3 months  Ultra-fine filters 2 times per month  Foam filter every 6 months  Humidifier chamber every 6 months  Chin strap every 6 months    Has been advised to continue the current CPAP, clean equipment frequently, and get new mask and supplies as allowed by insurance and DME. Recommend an earlier appointment, if significant treatment barriers develop.    Advised patient to reach out via [employer] if any questions or concerns should  arise.

## 2024-09-30 ENCOUNTER — TELEPHONE (OUTPATIENT)
Dept: SLEEP MEDICINE | Facility: MEDICAL CENTER | Age: 74
End: 2024-09-30
Payer: MEDICARE

## 2024-10-04 ENCOUNTER — TELEPHONE (OUTPATIENT)
Dept: SLEEP MEDICINE | Facility: MEDICAL CENTER | Age: 74
End: 2024-10-04
Payer: MEDICARE

## 2024-10-04 DIAGNOSIS — G47.33 OSA ON CPAP: ICD-10-CM
